# Patient Record
Sex: MALE | Race: WHITE | NOT HISPANIC OR LATINO | URBAN - METROPOLITAN AREA
[De-identification: names, ages, dates, MRNs, and addresses within clinical notes are randomized per-mention and may not be internally consistent; named-entity substitution may affect disease eponyms.]

---

## 2021-04-15 ENCOUNTER — OFFICE VISIT (OUTPATIENT)
Dept: URBAN - METROPOLITAN AREA CLINIC 7 | Facility: CLINIC | Age: 46
End: 2021-04-15

## 2021-04-22 ENCOUNTER — OFFICE VISIT (OUTPATIENT)
Dept: URBAN - METROPOLITAN AREA SURGERY CENTER 5 | Facility: SURGERY CENTER | Age: 46
End: 2021-04-22

## 2021-06-01 ENCOUNTER — OFFICE VISIT (OUTPATIENT)
Age: 46
End: 2021-06-01

## 2021-08-18 ENCOUNTER — TELEPHONE ENCOUNTER (OUTPATIENT)
Dept: URBAN - METROPOLITAN AREA CLINIC 9 | Facility: CLINIC | Age: 46
End: 2021-08-18

## 2021-09-14 ENCOUNTER — OFFICE VISIT (OUTPATIENT)
Dept: URBAN - METROPOLITAN AREA CLINIC 7 | Facility: CLINIC | Age: 46
End: 2021-09-14

## 2021-09-15 ENCOUNTER — OFFICE VISIT (OUTPATIENT)
Dept: URBAN - METROPOLITAN AREA SURGERY CENTER 5 | Facility: SURGERY CENTER | Age: 46
End: 2021-09-15

## 2021-09-30 ENCOUNTER — TELEPHONE ENCOUNTER (OUTPATIENT)
Dept: URBAN - METROPOLITAN AREA CLINIC 9 | Facility: CLINIC | Age: 46
End: 2021-09-30

## 2021-10-11 ENCOUNTER — TELEPHONE ENCOUNTER (OUTPATIENT)
Dept: URBAN - METROPOLITAN AREA CLINIC 9 | Facility: CLINIC | Age: 46
End: 2021-10-11

## 2021-10-14 ENCOUNTER — OFFICE VISIT (OUTPATIENT)
Dept: URBAN - METROPOLITAN AREA CLINIC 7 | Facility: CLINIC | Age: 46
End: 2021-10-14

## 2021-11-01 ENCOUNTER — OFFICE VISIT (OUTPATIENT)
Dept: URBAN - METROPOLITAN AREA CLINIC 7 | Facility: CLINIC | Age: 46
End: 2021-11-01

## 2022-01-31 ENCOUNTER — LAB OUTSIDE AN ENCOUNTER (OUTPATIENT)
Age: 47
End: 2022-01-31

## 2022-01-31 ENCOUNTER — TELEPHONE ENCOUNTER (OUTPATIENT)
Dept: URBAN - METROPOLITAN AREA CLINIC 9 | Facility: CLINIC | Age: 47
End: 2022-01-31

## 2022-02-08 ENCOUNTER — LAB OUTSIDE AN ENCOUNTER (OUTPATIENT)
Age: 47
End: 2022-02-08

## 2022-02-11 LAB
ALBUMIN/GLOBULIN RATIO: (no result)
ALBUMIN: (no result)
ALKALINE PHOSPHATASE: (no result)
ALPHA FETOPROTEIN, TUMOR MARKER: (no result)
ALT: (no result)
AST: (no result)
BILIRUBIN, TOTAL: (no result)
BUN/CREATININE RATIO: (no result)
CALCIUM: (no result)
CARBON DIOXIDE: (no result)
CHLORIDE: (no result)
CLIENT EDUCATION TRACKING: (no result)
CREATININE: (no result)
EGFR AFRICAN AMERICAN: 120
EGFR NON-AFR. AMERICAN: 104
GLOBULIN: 3.3
GLUCOSE: (no result)
INR: 1.3
POTASSIUM: (no result)
PROTEIN, TOTAL: (no result)
PT: (no result)
SODIUM: (no result)
UREA NITROGEN (BUN): (no result)

## 2022-02-17 ENCOUNTER — TELEPHONE ENCOUNTER (OUTPATIENT)
Dept: URBAN - METROPOLITAN AREA CLINIC 9 | Facility: CLINIC | Age: 47
End: 2022-02-17

## 2022-02-24 ENCOUNTER — OFFICE VISIT (OUTPATIENT)
Dept: URBAN - METROPOLITAN AREA CLINIC 7 | Facility: CLINIC | Age: 47
End: 2022-02-24

## 2022-06-09 ENCOUNTER — TELEPHONE ENCOUNTER (OUTPATIENT)
Dept: URBAN - METROPOLITAN AREA CLINIC 9 | Facility: CLINIC | Age: 47
End: 2022-06-09

## 2022-07-09 ENCOUNTER — TELEPHONE ENCOUNTER (OUTPATIENT)
Dept: URBAN - METROPOLITAN AREA CLINIC 121 | Facility: CLINIC | Age: 47
End: 2022-07-09

## 2022-07-10 ENCOUNTER — TELEPHONE ENCOUNTER (OUTPATIENT)
Dept: URBAN - METROPOLITAN AREA CLINIC 121 | Facility: CLINIC | Age: 47
End: 2022-07-10

## 2022-07-12 ENCOUNTER — TELEPHONE ENCOUNTER (OUTPATIENT)
Dept: URBAN - METROPOLITAN AREA CLINIC 9 | Facility: CLINIC | Age: 47
End: 2022-07-12

## 2022-07-14 ENCOUNTER — LAB OUTSIDE AN ENCOUNTER (OUTPATIENT)
Age: 47
End: 2022-07-14

## 2022-07-20 ENCOUNTER — OFFICE VISIT (OUTPATIENT)
Dept: URBAN - METROPOLITAN AREA CLINIC 9 | Facility: CLINIC | Age: 47
End: 2022-07-20

## 2022-07-22 LAB
ABSOLUTE BASOPHILS: (no result)
ABSOLUTE EOSINOPHILS: (no result)
ABSOLUTE LYMPHOCYTES: (no result)
ABSOLUTE MONOCYTES: (no result)
ABSOLUTE NEUTROPHILS: (no result)
ALBUMIN/GLOBULIN RATIO: (no result)
ALBUMIN: (no result)
ALKALINE PHOSPHATASE: (no result)
ALT: (no result)
AST: (no result)
BASOPHILS: (no result)
BILIRUBIN, TOTAL: (no result)
BUN/CREATININE RATIO: (no result)
CALCIUM: (no result)
CARBON DIOXIDE: (no result)
CHLORIDE: (no result)
CLIENT EDUCATION TRACKING: (no result)
COMMENT(S): (no result)
CREATININE: (no result)
EGFR: 120
EOSINOPHILS: (no result)
GLOBULIN: 3.6
GLUCOSE: (no result)
HEMATOCRIT: (no result)
HEMOGLOBIN: (no result)
INR: 1.4
LYMPHOCYTES: (no result)
MCH: (no result)
MCHC: (no result)
MCV: (no result)
MONOCYTES: (no result)
MPV: (no result)
NEUTROPHILS: (no result)
PLATELET COUNT: 35
POTASSIUM: (no result)
PROTEIN, TOTAL: (no result)
PT: (no result)
RDW: (no result)
RED BLOOD CELL COUNT: (no result)
SODIUM: (no result)
UREA NITROGEN (BUN): (no result)
WHITE BLOOD CELL COUNT: 4.1

## 2022-07-30 ENCOUNTER — TELEPHONE ENCOUNTER (OUTPATIENT)
Age: 47
End: 2022-07-30

## 2022-07-31 ENCOUNTER — TELEPHONE ENCOUNTER (OUTPATIENT)
Age: 47
End: 2022-07-31

## 2022-07-31 RX ORDER — RIFAXIMIN 550 MG/1
1 (ONE) TABLET ORAL
Qty: 0 | Refills: 7 | Status: ACTIVE | COMMUNITY
Start: 2022-07-20

## 2022-07-31 RX ORDER — RIFAXIMIN 550 MG/1
1 (ONE) TABLET ORAL
Qty: 0 | Refills: 7 | Status: ACTIVE | COMMUNITY
Start: 2021-11-01

## 2022-07-31 RX ORDER — LACTULOSE 10 G/15ML
30 SOLUTION ORAL
Qty: 0 | Refills: 5 | Status: ACTIVE | COMMUNITY
Start: 2022-07-20

## 2022-07-31 RX ORDER — BACITRACIN 500 UNIT/G
2 (TWO) OINTMENT (GRAM) TOPICAL DAILY
Qty: 0 | Refills: 16 | Status: ACTIVE | COMMUNITY
Start: 2021-09-14

## 2022-07-31 RX ORDER — LACTULOSE 10 G/15ML
30 SOLUTION ORAL
Qty: 0 | Refills: 5 | Status: ACTIVE | COMMUNITY
Start: 2021-09-14

## 2022-07-31 RX ORDER — BACITRACIN 500 UNIT/G
2 (TWO) OINTMENT (GRAM) TOPICAL DAILY
Qty: 0 | Refills: 16 | Status: ACTIVE | COMMUNITY
Start: 2022-07-20

## 2022-08-19 ENCOUNTER — WEB ENCOUNTER (OUTPATIENT)
Dept: URBAN - METROPOLITAN AREA CLINIC 9 | Facility: CLINIC | Age: 47
End: 2022-08-19

## 2022-08-19 ENCOUNTER — OFFICE VISIT (OUTPATIENT)
Dept: URBAN - METROPOLITAN AREA CLINIC 9 | Facility: CLINIC | Age: 47
End: 2022-08-19
Payer: COMMERCIAL

## 2022-08-19 VITALS
DIASTOLIC BLOOD PRESSURE: 77 MMHG | WEIGHT: 202 LBS | HEIGHT: 70 IN | BODY MASS INDEX: 28.92 KG/M2 | SYSTOLIC BLOOD PRESSURE: 160 MMHG

## 2022-08-19 DIAGNOSIS — F10.11 ALCOHOL ABUSE, IN REMISSION: ICD-10-CM

## 2022-08-19 DIAGNOSIS — K21.9 GERD WITHOUT ESOPHAGITIS: ICD-10-CM

## 2022-08-19 DIAGNOSIS — K70.30 ALCOHOLIC CIRRHOSIS OF LIVER WITHOUT ASCITES: ICD-10-CM

## 2022-08-19 PROCEDURE — 99213 OFFICE O/P EST LOW 20 MIN: CPT | Performed by: INTERNAL MEDICINE

## 2022-08-19 RX ORDER — BACITRACIN 500 UNIT/G
2 (TWO) OINTMENT (GRAM) TOPICAL DAILY
OUTPATIENT
Start: 2022-07-20

## 2022-08-19 RX ORDER — BACITRACIN 500 UNIT/G
2 (TWO) OINTMENT (GRAM) TOPICAL DAILY
Qty: 0 | Refills: 16 | Status: ACTIVE | COMMUNITY
Start: 2022-07-20

## 2022-08-19 RX ORDER — RIFAXIMIN 550 MG/1
1 (ONE) TABLET ORAL
OUTPATIENT
Start: 2021-11-01

## 2022-08-19 RX ORDER — LACTULOSE 10 G/15ML
30 SOLUTION ORAL
Qty: 0 | Refills: 5 | Status: ACTIVE | COMMUNITY
Start: 2022-07-20

## 2022-08-19 RX ORDER — DULOXETINE 20 MG/1
1 CAPSULE CAPSULE, DELAYED RELEASE PELLETS ORAL TWICE A DAY
Qty: 60 | Status: ACTIVE | COMMUNITY
Start: 2022-08-19

## 2022-08-19 RX ORDER — NEBIVOLOL HYDROCHLORIDE 20 MG/1
1 TABLET TABLET ORAL ONCE A DAY
Qty: 30 | Status: DISCONTINUED | COMMUNITY
Start: 2022-08-19

## 2022-08-19 RX ORDER — LACTULOSE 10 G/15ML
30 SOLUTION ORAL
OUTPATIENT
Start: 2022-07-20

## 2022-08-19 RX ORDER — RIFAXIMIN 550 MG/1
1 (ONE) TABLET ORAL
Qty: 0 | Refills: 7 | Status: ACTIVE | COMMUNITY
Start: 2021-11-01

## 2022-08-19 NOTE — HPI-TODAY'S VISIT:
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 . Pt ceased alcohol about 12/2021. . 4/2021 Colon 9/2021 EGD small varices, no banding . AFP chronically elevated 8-9 MRI 2/2022 no tumor MRI 7/2022 no tumor, changes of cirrhosis . 7/2022 labs calculate na meld of 17 8/2022 labs calculate na meld of 18 . Pt was seen at [facility] a few weeks ago per his report, I don't have that info. They are considering OLT but they need to improve his platelet situation. Will plan MRI and AFP in 6 months for f/u of the liver. He is due in 9/2022 for another EGD but that cannot be done unless plt are improved. 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 .

## 2022-11-11 ENCOUNTER — OFFICE VISIT (OUTPATIENT)
Dept: URBAN - METROPOLITAN AREA CLINIC 9 | Facility: CLINIC | Age: 47
End: 2022-11-11

## 2022-11-18 ENCOUNTER — TELEPHONE ENCOUNTER (OUTPATIENT)
Dept: URBAN - METROPOLITAN AREA CLINIC 7 | Facility: CLINIC | Age: 47
End: 2022-11-18

## 2022-11-28 ENCOUNTER — OFFICE VISIT (OUTPATIENT)
Dept: URBAN - METROPOLITAN AREA SURGERY CENTER 9 | Facility: SURGERY CENTER | Age: 47
End: 2022-11-28
Payer: COMMERCIAL

## 2022-11-28 ENCOUNTER — CLAIMS CREATED FROM THE CLAIM WINDOW (OUTPATIENT)
Dept: URBAN - METROPOLITAN AREA CLINIC 4 | Facility: CLINIC | Age: 47
End: 2022-11-28
Payer: COMMERCIAL

## 2022-11-28 DIAGNOSIS — Z87.19 ESOPHAGEAL FOOD BOLUS: ICD-10-CM

## 2022-11-28 DIAGNOSIS — K29.70 CHRONIC ACITVE GASTRITIS (H.PYLORI NEGATIVE): ICD-10-CM

## 2022-11-28 DIAGNOSIS — K31.89 OTHER DISEASES OF STOMACH AND DUODENUM: ICD-10-CM

## 2022-11-28 PROCEDURE — 88305 TISSUE EXAM BY PATHOLOGIST: CPT | Performed by: PATHOLOGY

## 2022-11-28 PROCEDURE — 43239 EGD BIOPSY SINGLE/MULTIPLE: CPT | Performed by: INTERNAL MEDICINE

## 2022-11-28 PROCEDURE — 45385 COLONOSCOPY W/LESION REMOVAL: CPT | Performed by: INTERNAL MEDICINE

## 2022-11-28 RX ORDER — BACITRACIN 500 UNIT/G
2 (TWO) OINTMENT (GRAM) TOPICAL DAILY
Status: ACTIVE | COMMUNITY
Start: 2022-07-20

## 2022-11-28 RX ORDER — DULOXETINE 20 MG/1
1 CAPSULE CAPSULE, DELAYED RELEASE PELLETS ORAL TWICE A DAY
Qty: 60 | Status: ACTIVE | COMMUNITY
Start: 2022-08-19

## 2022-11-28 RX ORDER — LACTULOSE 10 G/15ML
30 SOLUTION ORAL
Status: ACTIVE | COMMUNITY
Start: 2022-07-20

## 2022-11-28 RX ORDER — RIFAXIMIN 550 MG/1
1 (ONE) TABLET ORAL
Status: ACTIVE | COMMUNITY
Start: 2021-11-01

## 2022-12-14 ENCOUNTER — OFFICE VISIT (OUTPATIENT)
Dept: URBAN - METROPOLITAN AREA CLINIC 9 | Facility: CLINIC | Age: 47
End: 2022-12-14

## 2023-01-01 ENCOUNTER — LAB OUTSIDE AN ENCOUNTER (OUTPATIENT)
Dept: URBAN - METROPOLITAN AREA CLINIC 9 | Facility: CLINIC | Age: 48
End: 2023-01-01

## 2023-01-24 ENCOUNTER — TELEPHONE ENCOUNTER (OUTPATIENT)
Dept: URBAN - METROPOLITAN AREA CLINIC 9 | Facility: CLINIC | Age: 48
End: 2023-01-24

## 2023-01-24 RX ORDER — RIFAXIMIN 550 MG/1
1 TABLET TABLET ORAL TWICE A DAY
Qty: 60 TABLET | Refills: 1
Start: 2021-11-01 | End: 2023-03-25

## 2023-05-10 ENCOUNTER — WEB ENCOUNTER (OUTPATIENT)
Dept: URBAN - METROPOLITAN AREA CLINIC 9 | Facility: CLINIC | Age: 48
End: 2023-05-10

## 2023-05-10 ENCOUNTER — OFFICE VISIT (OUTPATIENT)
Dept: URBAN - METROPOLITAN AREA CLINIC 9 | Facility: CLINIC | Age: 48
End: 2023-05-10
Payer: COMMERCIAL

## 2023-05-10 VITALS
BODY MASS INDEX: 28.63 KG/M2 | SYSTOLIC BLOOD PRESSURE: 130 MMHG | WEIGHT: 200 LBS | HEIGHT: 70 IN | DIASTOLIC BLOOD PRESSURE: 80 MMHG

## 2023-05-10 DIAGNOSIS — F10.11 ALCOHOL ABUSE, IN REMISSION: ICD-10-CM

## 2023-05-10 DIAGNOSIS — K70.30 ALCOHOLIC CIRRHOSIS OF LIVER WITHOUT ASCITES: ICD-10-CM

## 2023-05-10 PROCEDURE — 99214 OFFICE O/P EST MOD 30 MIN: CPT | Performed by: INTERNAL MEDICINE

## 2023-05-10 RX ORDER — LACTULOSE 10 G/15ML
30 SOLUTION ORAL
OUTPATIENT
Start: 2022-07-20

## 2023-05-10 RX ORDER — BACITRACIN 500 UNIT/G
2 (TWO) OINTMENT (GRAM) TOPICAL DAILY
Status: ACTIVE | COMMUNITY
Start: 2022-07-20

## 2023-05-10 RX ORDER — LACTULOSE 10 G/15ML
30 SOLUTION ORAL
Status: ACTIVE | COMMUNITY
Start: 2022-07-20

## 2023-05-10 RX ORDER — FUROSEMIDE 20 MG/1
1 TABLET TABLET ORAL ONCE A DAY
Qty: 30 | Refills: 3 | OUTPATIENT
Start: 2023-05-10

## 2023-05-10 RX ORDER — SPIRONOLACTONE 50 MG/1
1 TABLET TABLET, FILM COATED ORAL ONCE A DAY
Qty: 30 | Refills: 3 | OUTPATIENT
Start: 2023-05-10 | End: 2023-06-09

## 2023-05-10 RX ORDER — DULOXETINE 20 MG/1
1 CAPSULE CAPSULE, DELAYED RELEASE PELLETS ORAL TWICE A DAY
Qty: 60 | Status: ACTIVE | COMMUNITY
Start: 2022-08-19

## 2023-05-10 NOTE — HPI-TODAY'S VISIT:
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 . Pt ceased alcohol about 12/2021. . 4/2021 Colon 9/2021 EGD small varices, no banding 2022 EGD with no varices, bx neg for HP . AFP chronically elevated 8-9 MRI 2/2022 no tumor MRI 7/2022 no tumor, changes of cirrhosis MRI 11/2022 with cirrhosis, no tumor . 7/2022 labs calculate na meld of 17 8/2022 labs calculate na meld of 18 5/2023 MELD labs around 12 . At this point pt here for f/u. His MELD is improved and he now has done etoh rehab so he is still under eval at [facility] but appears they are not proceeding with OLT at this time. he does need repeat imaging for HCC so will plan on MRI and also AFP. He is agreeable. Will arrange.  .

## 2023-05-12 PROBLEM — 266435005: Status: ACTIVE | Noted: 2023-05-12

## 2023-07-11 ENCOUNTER — OFFICE VISIT (OUTPATIENT)
Dept: URBAN - METROPOLITAN AREA CLINIC 9 | Facility: CLINIC | Age: 48
End: 2023-07-11

## 2023-08-11 ENCOUNTER — OFFICE VISIT (OUTPATIENT)
Dept: URBAN - METROPOLITAN AREA CLINIC 9 | Facility: CLINIC | Age: 48
End: 2023-08-11

## 2023-08-11 RX ORDER — LACTULOSE 10 G/15ML
30 SOLUTION ORAL
Status: ACTIVE | COMMUNITY
Start: 2022-07-20

## 2023-08-11 RX ORDER — FUROSEMIDE 20 MG/1
1 TABLET TABLET ORAL ONCE A DAY
Qty: 30 | Refills: 3 | Status: ACTIVE | COMMUNITY
Start: 2023-05-10

## 2023-08-11 RX ORDER — BACITRACIN 500 UNIT/G
2 (TWO) OINTMENT (GRAM) TOPICAL DAILY
Status: ACTIVE | COMMUNITY
Start: 2022-07-20

## 2023-08-11 RX ORDER — DULOXETINE 20 MG/1
1 CAPSULE CAPSULE, DELAYED RELEASE PELLETS ORAL TWICE A DAY
Qty: 60 | Status: ACTIVE | COMMUNITY
Start: 2022-08-19

## 2024-04-23 ENCOUNTER — OV EP (OUTPATIENT)
Dept: URBAN - METROPOLITAN AREA CLINIC 9 | Facility: CLINIC | Age: 49
End: 2024-04-23
Payer: COMMERCIAL

## 2024-04-23 VITALS
WEIGHT: 231 LBS | HEIGHT: 70 IN | DIASTOLIC BLOOD PRESSURE: 80 MMHG | BODY MASS INDEX: 33.07 KG/M2 | SYSTOLIC BLOOD PRESSURE: 126 MMHG

## 2024-04-23 DIAGNOSIS — F10.11 ALCOHOL ABUSE, IN REMISSION: ICD-10-CM

## 2024-04-23 DIAGNOSIS — K70.30 ALCOHOLIC CIRRHOSIS OF LIVER WITHOUT ASCITES: ICD-10-CM

## 2024-04-23 PROCEDURE — 99214 OFFICE O/P EST MOD 30 MIN: CPT | Performed by: INTERNAL MEDICINE

## 2024-04-23 RX ORDER — LACTULOSE 10 G/15ML
30 SOLUTION ORAL
Status: ACTIVE | COMMUNITY
Start: 2022-07-20

## 2024-04-23 RX ORDER — ATORVASTATIN CALCIUM 10 MG/1
1 TABLET TABLET, FILM COATED ORAL ONCE A DAY
Qty: 30 | Status: ACTIVE | COMMUNITY
Start: 2024-04-23

## 2024-04-23 RX ORDER — FUROSEMIDE 20 MG/1
1 TABLET TABLET ORAL ONCE A DAY
Qty: 30 | Refills: 3 | Status: ACTIVE | COMMUNITY
Start: 2023-05-10

## 2024-04-23 RX ORDER — TADALAFIL 10 MG/1
1 TABLET AS NEEDED TABLET, FILM COATED ORAL ONCE A DAY
Qty: 30 | Status: ACTIVE | COMMUNITY
Start: 2024-04-23 | End: 2024-05-23

## 2024-04-23 RX ORDER — GABAPENTIN 300 MG/1
1 CAPSULE CAPSULE ORAL ONCE A DAY
Qty: 30 | Status: ACTIVE | COMMUNITY
Start: 2024-04-23

## 2024-04-23 RX ORDER — NEBIVOLOL 20 MG/1
1 TABLET TABLET ORAL ONCE A DAY
Qty: 30 | Status: ACTIVE | COMMUNITY
Start: 2024-04-23

## 2024-04-23 RX ORDER — SPIRONOLACTONE 100 MG/1
1 TABLET TABLET, FILM COATED ORAL ONCE A DAY
Qty: 30 | Refills: 0 | OUTPATIENT
Start: 2024-04-23 | End: 2024-05-23

## 2024-04-23 RX ORDER — SERTRALINE 50 MG/1
1 TABLET TABLET, FILM COATED ORAL ONCE A DAY
Qty: 30 | Status: ACTIVE | COMMUNITY
Start: 2024-04-23

## 2024-04-23 RX ORDER — FUROSEMIDE 20 MG/1
1 TABLET TABLET ORAL ONCE A DAY
Qty: 30 | Status: ACTIVE | COMMUNITY
Start: 2024-04-23

## 2024-04-23 RX ORDER — FUROSEMIDE 40 MG/1
1 TABLET TABLET ORAL ONCE A DAY
Qty: 30 | Refills: 0 | OUTPATIENT
Start: 2024-04-23

## 2024-04-23 RX ORDER — DULOXETINE 20 MG/1
1 CAPSULE CAPSULE, DELAYED RELEASE PELLETS ORAL TWICE A DAY
Qty: 60 | Status: ON HOLD | COMMUNITY
Start: 2022-08-19

## 2024-04-23 RX ORDER — SPIRONOLACTONE 50 MG/1
1 TABLET TABLET, FILM COATED ORAL ONCE A DAY
Qty: 30 | Status: ACTIVE | COMMUNITY
Start: 2024-04-23 | End: 2024-05-23

## 2024-04-23 RX ORDER — BACITRACIN 500 UNIT/G
2 (TWO) OINTMENT (GRAM) TOPICAL DAILY
Status: ON HOLD | COMMUNITY
Start: 2022-07-20

## 2024-04-23 NOTE — HPI-TODAY'S VISIT:
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 Pt has intermittent resumed etoh intake . 4/2021 Colon 9/2021 EGD small varices, no banding 2022 EGD with no varices, bx neg for HP . AFP chronically elevated 8-9 MRI 2/2022 no tumor MRI 7/2022 no tumor, changes of cirrhosis MRI 11/2022 with cirrhosis, no tumor CT a/p with gastric thickening in 2024 . 7/2022 labs calculate na meld of 17 8/2022 labs calculate na meld of 18 5/2023 MELD labs around 12 4/2024 MELD 22 . Pt now discharged. He is on lasix and aldactone. He is on xifaxan. He has had paracentesis x2 and multiple admissions in the past 30 days and I advised he needs liver tumor imaging and AFP and repeat meld and complete etoh abstinence. He understands. RTC 1 mo.  .  .

## 2024-08-06 ENCOUNTER — OFFICE VISIT (OUTPATIENT)
Dept: URBAN - METROPOLITAN AREA CLINIC 9 | Facility: CLINIC | Age: 49
End: 2024-08-06